# Patient Record
Sex: FEMALE | HISPANIC OR LATINO
[De-identification: names, ages, dates, MRNs, and addresses within clinical notes are randomized per-mention and may not be internally consistent; named-entity substitution may affect disease eponyms.]

---

## 2017-08-08 ENCOUNTER — RESULT REVIEW (OUTPATIENT)
Age: 47
End: 2017-08-08

## 2018-02-06 ENCOUNTER — APPOINTMENT (OUTPATIENT)
Dept: UROLOGY | Facility: CLINIC | Age: 48
End: 2018-02-06
Payer: COMMERCIAL

## 2018-02-06 VITALS
DIASTOLIC BLOOD PRESSURE: 84 MMHG | HEART RATE: 97 BPM | SYSTOLIC BLOOD PRESSURE: 124 MMHG | RESPIRATION RATE: 17 BRPM | TEMPERATURE: 97.5 F

## 2018-02-06 DIAGNOSIS — Z87.898 PERSONAL HISTORY OF OTHER SPECIFIED CONDITIONS: ICD-10-CM

## 2018-02-06 DIAGNOSIS — R52 PAIN, UNSPECIFIED: ICD-10-CM

## 2018-02-06 DIAGNOSIS — Z78.9 OTHER SPECIFIED HEALTH STATUS: ICD-10-CM

## 2018-02-06 PROCEDURE — 99203 OFFICE O/P NEW LOW 30 MIN: CPT

## 2018-02-13 ENCOUNTER — OTHER (OUTPATIENT)
Age: 48
End: 2018-02-13

## 2018-02-13 LAB
APPEARANCE: CLEAR
BACTERIA UR CULT: ABNORMAL
BACTERIA: ABNORMAL
BILIRUBIN URINE: NEGATIVE
BLOOD URINE: NEGATIVE
COLOR: YELLOW
GLUCOSE QUALITATIVE U: NEGATIVE MG/DL
HYALINE CASTS: 1 /LPF
KETONES URINE: NEGATIVE
LEUKOCYTE ESTERASE URINE: ABNORMAL
MICROSCOPIC-UA: NORMAL
NITRITE URINE: NEGATIVE
PH URINE: 7.5
PROTEIN URINE: NEGATIVE MG/DL
RED BLOOD CELLS URINE: 1 /HPF
SPECIFIC GRAVITY URINE: 1.01
SQUAMOUS EPITHELIAL CELLS: 2 /HPF
UROBILINOGEN URINE: NEGATIVE MG/DL
WHITE BLOOD CELLS URINE: 46 /HPF

## 2018-02-14 ENCOUNTER — APPOINTMENT (OUTPATIENT)
Dept: UROLOGY | Facility: CLINIC | Age: 48
End: 2018-02-14
Payer: COMMERCIAL

## 2018-02-14 VITALS — DIASTOLIC BLOOD PRESSURE: 79 MMHG | RESPIRATION RATE: 16 BRPM | HEART RATE: 64 BPM | SYSTOLIC BLOOD PRESSURE: 131 MMHG

## 2018-02-14 PROCEDURE — 99214 OFFICE O/P EST MOD 30 MIN: CPT

## 2018-03-11 ENCOUNTER — FORM ENCOUNTER (OUTPATIENT)
Age: 48
End: 2018-03-11

## 2018-03-12 ENCOUNTER — OUTPATIENT (OUTPATIENT)
Dept: OUTPATIENT SERVICES | Facility: HOSPITAL | Age: 48
LOS: 1 days | End: 2018-03-12
Payer: COMMERCIAL

## 2018-03-12 ENCOUNTER — APPOINTMENT (OUTPATIENT)
Dept: ULTRASOUND IMAGING | Facility: IMAGING CENTER | Age: 48
End: 2018-03-12
Payer: COMMERCIAL

## 2018-03-12 ENCOUNTER — APPOINTMENT (OUTPATIENT)
Dept: UROLOGY | Facility: CLINIC | Age: 48
End: 2018-03-12
Payer: COMMERCIAL

## 2018-03-12 VITALS
RESPIRATION RATE: 16 BRPM | TEMPERATURE: 98 F | SYSTOLIC BLOOD PRESSURE: 119 MMHG | DIASTOLIC BLOOD PRESSURE: 66 MMHG | HEART RATE: 67 BPM

## 2018-03-12 DIAGNOSIS — Z00.8 ENCOUNTER FOR OTHER GENERAL EXAMINATION: ICD-10-CM

## 2018-03-12 DIAGNOSIS — R35.0 FREQUENCY OF MICTURITION: ICD-10-CM

## 2018-03-12 DIAGNOSIS — N39.0 URINARY TRACT INFECTION, SITE NOT SPECIFIED: ICD-10-CM

## 2018-03-12 PROCEDURE — 76770 US EXAM ABDO BACK WALL COMP: CPT

## 2018-03-12 PROCEDURE — 52000 CYSTOURETHROSCOPY: CPT

## 2018-03-12 PROCEDURE — 76770 US EXAM ABDO BACK WALL COMP: CPT | Mod: 26

## 2018-03-14 DIAGNOSIS — R30.9 PAINFUL MICTURITION, UNSPECIFIED: ICD-10-CM

## 2018-03-14 DIAGNOSIS — N30.10 INTERSTITIAL CYSTITIS (CHRONIC) WITHOUT HEMATURIA: ICD-10-CM

## 2018-03-14 DIAGNOSIS — N39.0 URINARY TRACT INFECTION, SITE NOT SPECIFIED: ICD-10-CM

## 2018-05-14 ENCOUNTER — APPOINTMENT (OUTPATIENT)
Dept: UROLOGY | Facility: CLINIC | Age: 48
End: 2018-05-14
Payer: COMMERCIAL

## 2018-05-14 DIAGNOSIS — N30.10 INTERSTITIAL CYSTITIS (CHRONIC) W/OUT HEMATURIA: ICD-10-CM

## 2018-05-14 DIAGNOSIS — R30.9 PAINFUL MICTURITION, UNSPECIFIED: ICD-10-CM

## 2018-05-14 DIAGNOSIS — N39.0 URINARY TRACT INFECTION, SITE NOT SPECIFIED: ICD-10-CM

## 2018-05-14 PROCEDURE — 99213 OFFICE O/P EST LOW 20 MIN: CPT

## 2018-07-25 PROBLEM — Z78.9 ALCOHOL USE: Status: ACTIVE | Noted: 2018-02-06

## 2018-08-21 LAB
A VAGINAE DNA VAG QL NAA+PROBE: ABNORMAL
BVAB2 DNA VAG QL NAA+PROBE: ABNORMAL
C KRUSEI DNA VAG QL NAA+PROBE: NEGATIVE
C KRUSEI DNA VAG QL NAA+PROBE: POSITIVE
CORE LAB FLUID CYTOLOGY: NORMAL
MEGA1 DNA VAG QL NAA+PROBE: NORMAL
T VAGINALIS RRNA SPEC QL NAA+PROBE: NEGATIVE

## 2018-11-28 ENCOUNTER — APPOINTMENT (OUTPATIENT)
Dept: UROLOGY | Facility: CLINIC | Age: 48
End: 2018-11-28

## 2019-04-08 ENCOUNTER — NON-APPOINTMENT (OUTPATIENT)
Age: 49
End: 2019-04-08

## 2019-04-08 ENCOUNTER — APPOINTMENT (OUTPATIENT)
Dept: CARDIOLOGY | Facility: CLINIC | Age: 49
End: 2019-04-08
Payer: COMMERCIAL

## 2019-04-08 VITALS
BODY MASS INDEX: 26.05 KG/M2 | HEART RATE: 60 BPM | RESPIRATION RATE: 15 BRPM | HEIGHT: 63 IN | WEIGHT: 147 LBS | DIASTOLIC BLOOD PRESSURE: 78 MMHG | SYSTOLIC BLOOD PRESSURE: 125 MMHG

## 2019-04-08 PROCEDURE — 99204 OFFICE O/P NEW MOD 45 MIN: CPT

## 2019-04-08 PROCEDURE — 93000 ELECTROCARDIOGRAM COMPLETE: CPT

## 2019-05-31 ENCOUNTER — APPOINTMENT (OUTPATIENT)
Dept: CARDIOLOGY | Facility: CLINIC | Age: 49
End: 2019-05-31
Payer: COMMERCIAL

## 2019-05-31 ENCOUNTER — TRANSCRIPTION ENCOUNTER (OUTPATIENT)
Age: 49
End: 2019-05-31

## 2019-05-31 VITALS
SYSTOLIC BLOOD PRESSURE: 126 MMHG | BODY MASS INDEX: 26.4 KG/M2 | WEIGHT: 149 LBS | RESPIRATION RATE: 15 BRPM | HEART RATE: 74 BPM | HEIGHT: 63 IN | DIASTOLIC BLOOD PRESSURE: 78 MMHG

## 2019-05-31 DIAGNOSIS — R07.89 OTHER CHEST PAIN: ICD-10-CM

## 2019-05-31 PROCEDURE — 99213 OFFICE O/P EST LOW 20 MIN: CPT | Mod: 25

## 2019-05-31 PROCEDURE — 93015 CV STRESS TEST SUPVJ I&R: CPT

## 2019-05-31 PROCEDURE — ZZZZZ: CPT

## 2019-05-31 NOTE — PHYSICAL EXAM
[General Appearance - Well Developed] : well developed [Normal Appearance] : normal appearance [Well Groomed] : well groomed [General Appearance - Well Nourished] : well nourished [No Deformities] : no deformities [General Appearance - In No Acute Distress] : no acute distress [Normal Conjunctiva] : the conjunctiva exhibited no abnormalities [Normal Oral Mucosa] : normal oral mucosa [Normal Oropharynx] : normal oropharynx [JVD Elevated _____cm] : JVD elevated [unfilled] ~U cm above clavicle [Normal Jugular Venous A Waves Present] : normal jugular venous A waves present [Normal Jugular Venous V Waves Present] : normal jugular venous V waves present [No Jugular Venous Ferreira A Waves] : no jugular venous ferreira A waves [Normal] : normal [No Precordial Heave] : no precordial heave was noted [Normal Rate] : normal [Rhythm Regular] : regular [Normal S1] : normal S1 [Normal S2] : normal S2 [No Gallop] : no gallop heard [2+] : left 2+ [No Abnormalities] : the abdominal aorta was not enlarged and no bruit was heard [No Pitting Edema] : no pitting edema present [Bowel Sounds] : normal bowel sounds [Abdomen Soft] : soft [Abnormal Walk] : normal gait [Gait - Sufficient For Exercise Testing] : the gait was sufficient for exercise testing [Nail Clubbing] : no clubbing of the fingernails [Cyanosis, Localized] : no localized cyanosis [Skin Color & Pigmentation] : normal skin color and pigmentation [Skin Turgor] : normal skin turgor [Oriented To Time, Place, And Person] : oriented to person, place, and time [Affect] : the affect was normal [Mood] : the mood was normal [No Anxiety] : not feeling anxious [] : no respiratory distress [Respiration, Rhythm And Depth] : normal respiratory rhythm and effort [Exaggerated Use Of Accessory Muscles For Inspiration] : no accessory muscle use [Auscultation Breath Sounds / Voice Sounds] : lungs were clear to auscultation bilaterally [S3] : no S3 [S4] : no S4 [Click] : no click [Distant] : the heart sounds were ~L not distant [Pericardial Rub] : no pericardial rub [II] : a grade 2 [Right Carotid Bruit] : no bruit heard over the right carotid [Left Carotid Bruit] : no bruit heard over the left carotid [Right Femoral Bruit] : no bruit heard over the right femoral artery [Left Femoral Bruit] : no bruit heard over the left femoral artery

## 2019-05-31 NOTE — DISCUSSION/SUMMARY
[FreeTextEntry1] : This is a 48-year-old female with no significant past medical history comes in for cardiac consultation the patient was well until this past summer when she had an episode of epigastric discomfort sharp in nature in the morning.  She started to drive herself to [facility].  The pain resolved prior to arriving in the emergency room and she decided to go home.  She denies shortness of breath, dizziness or syncope.  She was born in [facility] and has no history of rheumatic fever.  She does not drink excessive caffeine or alcohol.  She may drink one to 2 cups of coffee per day.\par Electrocardiogram done April 8, 2019 demonstrated normal sinus rhythm rate of 60 beats per minutes otherwise unremarkable.\par The patient will schedule exercise stress test to rule out significant coronary artery disease.  She will also schedule echo Doppler examination to evaluate her murmur, left ventricular function, chamber size, symptoms of chest pain, rule out mitral valve prolapse, rule out pericardial effusion.\par The patient will follow up with you regarding her medical care.  Please send a copy of her most recent blood work for my review.  She is instructed to followup with you regarding her medical care.  Further recommendations when the results of her blood work are available for my review.

## 2019-06-06 RX ORDER — METHENAMINE HIPPURATE 1 G/1
1 TABLET ORAL DAILY
Qty: 90 | Refills: 3 | Status: DISCONTINUED | COMMUNITY
Start: 2018-02-06 | End: 2019-06-06

## 2019-06-06 RX ORDER — CEFADROXIL 500 MG/1
500 CAPSULE ORAL
Qty: 14 | Refills: 0 | Status: DISCONTINUED | COMMUNITY
Start: 2018-02-13 | End: 2019-06-06

## 2019-07-01 ENCOUNTER — APPOINTMENT (OUTPATIENT)
Dept: CARDIOLOGY | Facility: CLINIC | Age: 49
End: 2019-07-01
Payer: COMMERCIAL

## 2019-07-01 DIAGNOSIS — R01.1 CARDIAC MURMUR, UNSPECIFIED: ICD-10-CM

## 2019-07-01 DIAGNOSIS — R07.9 CHEST PAIN, UNSPECIFIED: ICD-10-CM

## 2019-07-01 DIAGNOSIS — R06.09 OTHER FORMS OF DYSPNEA: ICD-10-CM

## 2019-07-01 PROCEDURE — 93306 TTE W/DOPPLER COMPLETE: CPT

## 2019-07-15 PROBLEM — R07.9 CHEST PAIN: Status: ACTIVE | Noted: 2019-04-08

## 2019-07-15 PROBLEM — R01.1 HEART MURMUR: Status: ACTIVE | Noted: 2019-04-08

## 2019-07-15 PROBLEM — R06.09 DOE (DYSPNEA ON EXERTION): Status: ACTIVE | Noted: 2019-05-31

## 2024-03-10 PROBLEM — N30.10 CHRONIC INTERSTITIAL CYSTITIS: Status: ACTIVE | Noted: 2018-02-15
